# Patient Record
Sex: MALE | Race: OTHER | Employment: FULL TIME | URBAN - METROPOLITAN AREA
[De-identification: names, ages, dates, MRNs, and addresses within clinical notes are randomized per-mention and may not be internally consistent; named-entity substitution may affect disease eponyms.]

---

## 2023-09-25 ENCOUNTER — OFFICE VISIT (OUTPATIENT)
Dept: FAMILY MEDICINE CLINIC | Facility: CLINIC | Age: 38
End: 2023-09-25
Payer: COMMERCIAL

## 2023-09-25 VITALS
DIASTOLIC BLOOD PRESSURE: 70 MMHG | TEMPERATURE: 97 F | HEIGHT: 64 IN | SYSTOLIC BLOOD PRESSURE: 112 MMHG | WEIGHT: 166.19 LBS | RESPIRATION RATE: 16 BRPM | BODY MASS INDEX: 28.37 KG/M2 | HEART RATE: 78 BPM

## 2023-09-25 DIAGNOSIS — R07.9 CHEST PAIN, UNSPECIFIED TYPE: ICD-10-CM

## 2023-09-25 DIAGNOSIS — R00.2 PALPITATIONS: ICD-10-CM

## 2023-09-25 DIAGNOSIS — L30.9 ECZEMA, UNSPECIFIED TYPE: ICD-10-CM

## 2023-09-25 DIAGNOSIS — R42 DIZZINESS: Primary | ICD-10-CM

## 2023-09-25 PROBLEM — I10 HYPERTENSION: Status: ACTIVE | Noted: 2023-09-25

## 2023-09-25 NOTE — PATIENT INSTRUCTIONS
Obtain blood pressure monitor. Check your BP readings at home twice daily, and write the numbers in a journal.  Schedule the echo and the Holter monitor. To schedule, call [phone].   Complete fasting blood work   Follow up in 2-3 weeks in the office for a physical exam

## 2023-09-29 ENCOUNTER — PATIENT MESSAGE (OUTPATIENT)
Dept: FAMILY MEDICINE CLINIC | Facility: CLINIC | Age: 38
End: 2023-09-29

## 2023-10-04 NOTE — TELEPHONE ENCOUNTER
Called Pt to give reminder to complete lab and imaging tests that were ordered from recent office visit 9/25/23. Pt voiced understanding but he declines doing the tests at this time. Pt states, \"I did not  go there because of dizziness. I went there because of my blood pressure but I am fine now. I called my insurance and they said blood work will cost me around $200, I didn't even ask them about the imaging tests. I am not going to do it. \"    Informed Pt that given reported symptoms upon recent visit, it is advisable to complete lab and imaging tests for evaluation and not doing them will be against medical advice. He voiced understanding.

## 2024-03-18 ENCOUNTER — TELEPHONE (OUTPATIENT)
Dept: FAMILY MEDICINE CLINIC | Facility: CLINIC | Age: 39
End: 2024-03-18

## 2024-03-18 NOTE — TELEPHONE ENCOUNTER
Patient is due for CPX with  or myself.   Please contact patient to schedule, or to determine if he has established care elsewhere, as he has not completed the labs previously ordered for him. If so, please remove PCP. Thank you.

## 2025-03-23 ENCOUNTER — OFFICE VISIT (OUTPATIENT)
Dept: FAMILY MEDICINE CLINIC | Facility: CLINIC | Age: 40
End: 2025-03-23
Payer: COMMERCIAL

## 2025-03-23 VITALS
HEIGHT: 63.78 IN | SYSTOLIC BLOOD PRESSURE: 116 MMHG | DIASTOLIC BLOOD PRESSURE: 68 MMHG | TEMPERATURE: 99 F | OXYGEN SATURATION: 97 % | HEART RATE: 102 BPM | WEIGHT: 169 LBS | RESPIRATION RATE: 18 BRPM | BODY MASS INDEX: 29.21 KG/M2

## 2025-03-23 DIAGNOSIS — R68.89 FLU-LIKE SYMPTOMS: ICD-10-CM

## 2025-03-23 DIAGNOSIS — H65.92 LEFT NON-SUPPURATIVE OTITIS MEDIA: Primary | ICD-10-CM

## 2025-03-23 PROCEDURE — 87637 SARSCOV2&INF A&B&RSV AMP PRB: CPT | Performed by: PHYSICIAN ASSISTANT

## 2025-03-23 RX ORDER — AMOXICILLIN 500 MG/1
1000 TABLET, FILM COATED ORAL 2 TIMES DAILY
Qty: 40 TABLET | Refills: 0 | Status: SHIPPED | OUTPATIENT
Start: 2025-03-23 | End: 2025-04-02

## 2025-03-23 NOTE — PROGRESS NOTES
CHIEF COMPLAINT:     Chief Complaint   Patient presents with    Sore Throat     Sore throat, congestion, body aches x 4 days        HPI:   Andrew Neri is a 40 year old male who presents for cold symptoms for 1 day.  103 fever. + body aches/chills. + headache. + nasal congestion. + sinus pressure. left ear pain. + sore throat. + dry cough. No chest pain or SOB. No GI symptoms. No covid at home testing. Family with similar symptoms       Current Outpatient Medications   Medication Sig Dispense Refill    amoxicillin 500 MG Oral Tab Take 2 tablets (1,000 mg total) by mouth 2 (two) times daily for 10 days. 40 tablet 0      Past Medical History:    Eczema      No past surgical history on file.   Family History   Problem Relation Age of Onset    Diabetes Mother       Social History     Socioeconomic History    Marital status:    Tobacco Use    Smoking status: Former     Current packs/day: 0.00     Types: Cigarettes     Quit date: 2021     Years since quittin.2    Smokeless tobacco: Never   Vaping Use    Vaping status: Never Used   Substance and Sexual Activity    Alcohol use: Yes     Comment: 1-2/week   Other Topics Concern    Caffeine Concern Yes     Comment: tea    Exercise Yes     Comment: walking at work         REVIEW OF SYSTEMS:   GENERAL:  reports  diminished appetite  SKIN: no rashes or abnormal skin lesions  HEENT: See HPI.    LUNGS: denies shortness of breath or wheezing, See HPI  CARDIOVASCULAR: denies chest pain or palpitations   GI: denies N/V/C or abdominal pain  NEURO: + sinus headaches.  No numbness or tingling in face.    EXAM:   /68   Pulse 102   Temp 98.9 °F (37.2 °C) (Oral)   Resp 18   Ht 5' 3.78\" (1.62 m)   Wt 169 lb (76.7 kg)   SpO2 97%   BMI 29.21 kg/m²   Physical Exam  Constitutional:       General: He is not in acute distress.     Appearance: Normal appearance.   HENT:      Head: Normocephalic.      Right Ear: Tympanic membrane, ear canal and external ear normal.       Left Ear: Ear canal and external ear normal. Tympanic membrane is erythematous.      Nose: Rhinorrhea present.      Mouth/Throat:      Mouth: Mucous membranes are moist.      Pharynx: Oropharynx is clear. Uvula midline. Posterior oropharyngeal erythema (post nasal drip) present.      Tonsils: No tonsillar exudate.   Eyes:      Conjunctiva/sclera: Conjunctivae normal.      Pupils: Pupils are equal, round, and reactive to light.   Cardiovascular:      Rate and Rhythm: Normal rate and regular rhythm.      Heart sounds: Normal heart sounds. No murmur heard.  Pulmonary:      Effort: Pulmonary effort is normal. No respiratory distress.      Breath sounds: Normal breath sounds. No wheezing or rhonchi.   Chest:      Chest wall: No tenderness.   Musculoskeletal:      Cervical back: Normal range of motion and neck supple.   Lymphadenopathy:      Cervical: No cervical adenopathy.   Skin:     General: Skin is warm.      Findings: No rash.   Neurological:      Mental Status: He is alert and oriented to person, place, and time.          No results found for this or any previous visit (from the past 24 hours).    ASSESSMENT AND PLAN:   Andrew Neri is a 40 year old male who presents with URI symptoms that are consistent with:      ASSESSMENT:  Encounter Diagnoses   Name Primary?    Left non-suppurative otitis media Yes    Flu-like symptoms      Quad test sent out. Patient declined antiviral medication for influenza- hx of hallucinations with medication     PLAN: Meds as below.  Comfort care instructions as listed in Patient Instructions    Meds & Refills for this Visit:  Requested Prescriptions     Signed Prescriptions Disp Refills    amoxicillin 500 MG Oral Tab 40 tablet 0     Sig: Take 2 tablets (1,000 mg total) by mouth 2 (two) times daily for 10 days.       Risks, benefits, side effects of medication addressed and explained.    Patient Instructions   Rest   Push fluids   Tylenol or ibuprofen OTC as needed for  pain/fever   Claritin or Zyrtec OTC once daily for nasal drainage  Flonase 1 spray each nostril twice daily for sinus pressure   Delsym OTC as needed for cough   Contagious until fever free for 24 hours without the help of medications ( tylenol or ibuprofen)    Please follow up with PCP if no improvement or if symptoms worsen      The patient indicates understanding of these issues and agrees to the plan.  The patient is asked to f/u with PCP if sx's persist or worsen.

## 2025-03-23 NOTE — PATIENT INSTRUCTIONS
Rest   Push fluids   Tylenol or ibuprofen OTC as needed for pain/fever   Claritin or Zyrtec OTC once daily for nasal drainage  Flonase 1 spray each nostril twice daily for sinus pressure   Delsym OTC as needed for cough   Contagious until fever free for 24 hours without the help of medications ( tylenol or ibuprofen)    Please follow up with PCP if no improvement or if symptoms worsen

## 2025-03-25 LAB
FLUAV + FLUBV RNA SPEC NAA+PROBE: DETECTED
FLUAV + FLUBV RNA SPEC NAA+PROBE: NOT DETECTED
RSV RNA SPEC NAA+PROBE: NOT DETECTED
SARS-COV-2 RNA RESP QL NAA+PROBE: NOT DETECTED

## (undated) NOTE — LETTER
Date: 3/23/2025    Patient Name: Andrew Neri          To Whom it may concern:    This letter has been written at the patient's request. The above patient was seen at [facility] for treatment of a medical condition.    This patient should be excused from attending work 3/23/25-3/25/25    Sincerely,    Radha Castellon PA-C